# Patient Record
(demographics unavailable — no encounter records)

---

## 2024-11-26 NOTE — HISTORY OF PRESENT ILLNESS
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  Patient is following up intermittently with the dermatologist.  73-year-old gentleman with a family history of melanoma status skin lesion that was pigmented on the dorsum of the right forearm for over 10 years and it has not changed in size but is got a little lighter and he went to the dermatologist who did a shave biopsy.  This showed a melanoma in situ and patient comes in for consideration of excision.  Patient has had no other skin lesions but his mother did have skin cancer and possibly melanoma.

## 2024-11-26 NOTE — REASON FOR VISIT
[Follow-Up: _____] : a [unfilled] follow-up visit [FreeTextEntry1] : Status post wide excision of right forearm melanoma in situ

## 2024-11-26 NOTE — PHYSICAL EXAM
[No Supraclavicular Adenopathy] : no supraclavicular adenopathy [No Cervical Adenopathy] : no cervical adenopathy [No Axillary Lymphadenopathy] : no right axillary lymphadenopathy [de-identified] : Right forearm incision has healed without pigmented areas or satellite lesions.